# Patient Record
Sex: MALE | Race: WHITE | Employment: FULL TIME | URBAN - METROPOLITAN AREA
[De-identification: names, ages, dates, MRNs, and addresses within clinical notes are randomized per-mention and may not be internally consistent; named-entity substitution may affect disease eponyms.]

---

## 2017-08-23 ENCOUNTER — HOSPITAL ENCOUNTER (EMERGENCY)
Age: 27
Discharge: HOME | End: 2017-08-23
Attending: EMERGENCY MEDICINE
Payer: COMMERCIAL

## 2017-08-23 VITALS
HEART RATE: 90 BPM | RESPIRATION RATE: 18 BRPM | HEIGHT: 67 IN | BODY MASS INDEX: 22.76 KG/M2 | TEMPERATURE: 99 F | OXYGEN SATURATION: 99 % | DIASTOLIC BLOOD PRESSURE: 88 MMHG | SYSTOLIC BLOOD PRESSURE: 158 MMHG | WEIGHT: 145 LBS

## 2017-08-23 DIAGNOSIS — T14.8XXA MUSCLE STRAIN: Primary | ICD-10-CM

## 2017-08-23 DIAGNOSIS — S40.021A CONTUSION OF RIGHT UPPER EXTREMITY, INITIAL ENCOUNTER: ICD-10-CM

## 2017-08-23 DIAGNOSIS — S39.012A BACK STRAIN, INITIAL ENCOUNTER: ICD-10-CM

## 2017-08-23 PROCEDURE — 99283 EMERGENCY DEPT VISIT LOW MDM: CPT
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
## 2019-11-19 ENCOUNTER — OFFICE VISIT (OUTPATIENT)
Dept: FAMILY MEDICINE CLINIC | Facility: CLINIC | Age: 29
End: 2019-11-19
Payer: COMMERCIAL

## 2019-11-19 VITALS
HEART RATE: 86 BPM | RESPIRATION RATE: 16 BRPM | BODY MASS INDEX: 27.25 KG/M2 | OXYGEN SATURATION: 98 % | TEMPERATURE: 99 F | HEIGHT: 67 IN | WEIGHT: 173.63 LBS

## 2019-11-19 DIAGNOSIS — H00.014 HORDEOLUM EXTERNUM OF LEFT UPPER EYELID: ICD-10-CM

## 2019-11-19 DIAGNOSIS — L03.90 CELLULITIS, UNSPECIFIED CELLULITIS SITE: Primary | ICD-10-CM

## 2019-11-19 PROCEDURE — 99202 OFFICE O/P NEW SF 15 MIN: CPT | Performed by: NURSE PRACTITIONER

## 2019-11-19 RX ORDER — CEPHALEXIN 500 MG/1
500 CAPSULE ORAL 2 TIMES DAILY
Qty: 14 CAPSULE | Refills: 0 | Status: SHIPPED | OUTPATIENT
Start: 2019-11-19 | End: 2019-11-26

## 2019-11-19 NOTE — PROGRESS NOTES
CHIEF COMPLAINT:   Patient presents with:  Eye Problem: left eye stye sx x 7 days. HPI:   Karissa Dozier is a 34year old male who presents with chief complaint of bump on left upper eyelid. Symptoms began 7 days ago.  Symptoms have been worsening si EYES: PERRLA, EOMI, normal optic disk, conjunctiva clear, no discharge. left eyelid swollen with soft erythematous tissue swelling and small pimple on lip of upper left eye lid. Mild tenderness with palpation. + lid swelling; + tenderness.   No swelling Cellulitis is an infection of the deep layers of skin. A break in the skin, such as a cut or scratch, can let bacteria under the skin. If the bacteria get to deep layers of the skin, it can be serious.  If not treated, cellulitis can get into the bloodstrea Date Last Reviewed: 9/1/2016  © 8650-1147 [employer]. 1407 [facility] [place], [address] [place]. All rights reserved. This information is not intended as a substitute for professional medical care.  Always follow your healthcare professional' · Inability to open the eyelid due to swelling  · Fever of 100.4°F (38°C) or above, or as directed by your provider  · Vision changes  · Headache or stiff neck  · The sty comes back  Date Last Reviewed: 8/1/2017  © 8500-1362 [employer].  1467 [facility]

## 2019-11-19 NOTE — PATIENT INSTRUCTIONS
Cellulitis  Cellulitis is an infection of the deep layers of skin. A break in the skin, such as a cut or scratch, can let bacteria under the skin. If the bacteria get to deep layers of the skin, it can be serious.  If not treated, cellulitis can get into · Fever higher of 100.4º F (38.0º C) or higher after 2 days on antibiotics  Date Last Reviewed: 9/1/2016  © 5530-7166 [employer]. 1407 [facility], [address] [place] [address]. All rights reserved.  This information is not intended as a substitut · Drainage of blood or thick pus from the sty  · Blister on the eyelid  · Inability to open the eyelid due to swelling  · Fever of 100.4°F (38°C) or above, or as directed by your provider  · Vision changes  · Headache or stiff neck  · The sty comes back  D

## 2019-12-17 ENCOUNTER — OFFICE VISIT (OUTPATIENT)
Dept: FAMILY MEDICINE CLINIC | Facility: CLINIC | Age: 29
End: 2019-12-17
Payer: COMMERCIAL

## 2019-12-17 VITALS
RESPIRATION RATE: 14 BRPM | TEMPERATURE: 99 F | OXYGEN SATURATION: 99 % | DIASTOLIC BLOOD PRESSURE: 78 MMHG | WEIGHT: 170 LBS | BODY MASS INDEX: 26.68 KG/M2 | HEIGHT: 67 IN | SYSTOLIC BLOOD PRESSURE: 118 MMHG | HEART RATE: 76 BPM

## 2019-12-17 DIAGNOSIS — H00.014 HORDEOLUM EXTERNUM OF LEFT UPPER EYELID: Primary | ICD-10-CM

## 2019-12-17 DIAGNOSIS — H69.83 DYSFUNCTION OF BOTH EUSTACHIAN TUBES: ICD-10-CM

## 2019-12-17 PROCEDURE — 99213 OFFICE O/P EST LOW 20 MIN: CPT | Performed by: NURSE PRACTITIONER

## 2019-12-17 RX ORDER — FLUTICASONE PROPIONATE 50 MCG
2 SPRAY, SUSPENSION (ML) NASAL DAILY
Qty: 1 BOTTLE | Refills: 0 | Status: SHIPPED | OUTPATIENT
Start: 2019-12-17 | End: 2019-12-31

## 2019-12-17 RX ORDER — ERYTHROMYCIN 5 MG/G
1 OINTMENT OPHTHALMIC EVERY 6 HOURS
Qty: 3.5 G | Refills: 0 | Status: SHIPPED | OUTPATIENT
Start: 2019-12-17 | End: 2019-12-24

## 2019-12-17 NOTE — PATIENT INSTRUCTIONS
Apply prescription ointment as directed  Apply warm, moist compress for 15 minutes throughout the day  Cleanse eyelids daily with a neutral soap ([employer]'s Baby Shampoo) in a 1:1 solution with clean water  If no improvement seek ophthalmologist    Flonase swelling  · Fever of 100.4°F (38°C) or above, or as directed by your provider  · Vision changes  · Headache or stiff neck  · The sty comes back  Date Last Reviewed: 8/1/2017  © 6088-5216 [employer]. 1407 [facility] [place], [address] [place] [address].

## 2019-12-17 NOTE — PROGRESS NOTES
CHIEF COMPLAINT:   Patient presents with:  Bump/lump On Eyelid: x today. HPI:   Miladis Escobar is a 34year old male who presents with chief complaint of bump on left upper eyelid. Symptoms began today. Symptoms have been consistent since onset. EYES: PERRLA, EOMI, conjunctiva clear, no discharge. Left upper eyelid with erythematous stye with minimal lid swelling; + tenderness. No swelling or redness of periorbital tissue. Vision corrected - right eye : 20/30, left eye 20/30.     HENT: atraumati Apply warm, moist compress for 15 minutes throughout the day  Cleanse eyelids daily with a neutral soap (Son's Baby Shampoo) in a 1:1 solution with clean water  If no improvement seek ophthalmologist    Flonase as prescribed         Lisa (or Stye)  DINA paula · Fever of 100.4°F (38°C) or above, or as directed by your provider  · Vision changes  · Headache or stiff neck  · The sty comes back  Date Last Reviewed: 8/1/2017  © 6358-2507 [employer]. 1407 [facility] [place], [address] [place] [address].  All right

## 2022-11-17 ENCOUNTER — HOSPITAL ENCOUNTER (EMERGENCY)
Age: 32
Discharge: HOME | End: 2022-11-18
Attending: EMERGENCY MEDICINE
Payer: COMMERCIAL

## 2022-11-17 ENCOUNTER — APPOINTMENT (OUTPATIENT)
Dept: GENERAL RADIOLOGY | Age: 32
End: 2022-11-17
Payer: COMMERCIAL

## 2022-11-17 DIAGNOSIS — R04.2 HEMOPTYSIS: ICD-10-CM

## 2022-11-17 DIAGNOSIS — R05.9 COUGH, UNSPECIFIED TYPE: Primary | ICD-10-CM

## 2022-11-17 PROCEDURE — 71045 X-RAY EXAM CHEST 1 VIEW: CPT

## 2022-11-17 PROCEDURE — 99283 EMERGENCY DEPT VISIT LOW MDM: CPT

## 2022-11-18 VITALS
BODY MASS INDEX: 25.11 KG/M2 | RESPIRATION RATE: 16 BRPM | HEART RATE: 79 BPM | HEIGHT: 67 IN | SYSTOLIC BLOOD PRESSURE: 136 MMHG | TEMPERATURE: 99 F | WEIGHT: 160 LBS | DIASTOLIC BLOOD PRESSURE: 78 MMHG | OXYGEN SATURATION: 97 %

## 2022-11-18 RX ORDER — BENZONATATE 100 MG/1
100 CAPSULE ORAL 3 TIMES DAILY PRN
Qty: 30 CAPSULE | Refills: 0 | Status: SHIPPED | OUTPATIENT
Start: 2022-11-18 | End: 2022-12-18

## 2022-11-18 NOTE — DISCHARGE INSTRUCTIONS
Please follow-up with your primary care physician 1-2 days return to the ER if your symptoms worsen progress or if you have any further concerns. Please use a humidifier at night use the benzonatate as prescribed. He can take up to 200 mg at a time. You can also use over-the-counter cough medicine like Robitussin-DM. If you are having difficulty breathing chest pain palpitations dizziness lightheadedness or any further concerning symptoms return to the ER immediately.

## 2022-11-18 NOTE — ED INITIAL ASSESSMENT (HPI)
PT to the ED for evaluation of productive cough with blood streaked sputum. PT reports the cough began 3 weeks ago when he tested positive for COVID. He now tests negative, but the cough has persisted and now his sputum is blood-streaked.

## 2023-03-21 ENCOUNTER — OFFICE VISIT (OUTPATIENT)
Dept: FAMILY MEDICINE CLINIC | Facility: CLINIC | Age: 33
End: 2023-03-21
Payer: COMMERCIAL

## 2023-03-21 VITALS
OXYGEN SATURATION: 97 % | TEMPERATURE: 98 F | SYSTOLIC BLOOD PRESSURE: 128 MMHG | BODY MASS INDEX: 25.9 KG/M2 | HEIGHT: 67 IN | DIASTOLIC BLOOD PRESSURE: 82 MMHG | WEIGHT: 165 LBS | RESPIRATION RATE: 18 BRPM | HEART RATE: 94 BPM

## 2023-03-21 DIAGNOSIS — H69.83 DYSFUNCTION OF BOTH EUSTACHIAN TUBES: ICD-10-CM

## 2023-03-21 DIAGNOSIS — K11.20 SIALADENITIS: ICD-10-CM

## 2023-03-21 DIAGNOSIS — H92.02 LEFT EAR PAIN: Primary | ICD-10-CM

## 2023-03-21 PROCEDURE — 3074F SYST BP LT 130 MM HG: CPT | Performed by: NURSE PRACTITIONER

## 2023-03-21 PROCEDURE — 3008F BODY MASS INDEX DOCD: CPT | Performed by: NURSE PRACTITIONER

## 2023-03-21 PROCEDURE — 3079F DIAST BP 80-89 MM HG: CPT | Performed by: NURSE PRACTITIONER

## 2023-03-21 PROCEDURE — 99202 OFFICE O/P NEW SF 15 MIN: CPT | Performed by: NURSE PRACTITIONER

## 2023-03-21 RX ORDER — FLUTICASONE PROPIONATE 50 MCG
1 SPRAY, SUSPENSION (ML) NASAL 2 TIMES DAILY
Qty: 1 EACH | Refills: 2 | Status: SHIPPED | OUTPATIENT
Start: 2023-03-21 | End: 2023-04-20

## 2023-03-21 RX ORDER — AMOXICILLIN 875 MG/1
875 TABLET, COATED ORAL 2 TIMES DAILY
Qty: 20 TABLET | Refills: 0 | Status: SHIPPED | OUTPATIENT
Start: 2023-03-21 | End: 2023-03-31

## 2023-03-21 NOTE — PATIENT INSTRUCTIONS
Please start Flonase 1 spray each nostril twice daily before brushing teeth. Use for at least one to two weeks. Ear pressure may take up to 3 months to resolve. May stop if improving. Restart if symptoms return. See next page for instructions for treatment of ear pain.  Follow up with PCP if symptoms persist.

## 2023-12-27 ENCOUNTER — APPOINTMENT (OUTPATIENT)
Dept: GENERAL RADIOLOGY | Age: 33
End: 2023-12-27
Payer: COMMERCIAL

## 2023-12-27 ENCOUNTER — APPOINTMENT (OUTPATIENT)
Dept: GENERAL RADIOLOGY | Age: 33
End: 2023-12-27
Attending: PHYSICIAN ASSISTANT
Payer: COMMERCIAL

## 2023-12-27 ENCOUNTER — HOSPITAL ENCOUNTER (EMERGENCY)
Age: 33
Discharge: HOME | End: 2023-12-27
Attending: EMERGENCY MEDICINE
Payer: COMMERCIAL

## 2023-12-27 VITALS
RESPIRATION RATE: 15 BRPM | DIASTOLIC BLOOD PRESSURE: 90 MMHG | SYSTOLIC BLOOD PRESSURE: 141 MMHG | HEART RATE: 85 BPM | OXYGEN SATURATION: 97 % | WEIGHT: 165 LBS | TEMPERATURE: 100 F | HEIGHT: 67 IN | BODY MASS INDEX: 25.9 KG/M2

## 2023-12-27 DIAGNOSIS — S61.022A LACERATION OF LEFT THUMB WITH FOREIGN BODY WITHOUT DAMAGE TO NAIL, INITIAL ENCOUNTER: Primary | ICD-10-CM

## 2023-12-27 DIAGNOSIS — S60.450A FOREIGN BODY OF RIGHT INDEX FINGER: ICD-10-CM

## 2023-12-27 PROCEDURE — 12001 RPR S/N/AX/GEN/TRNK 2.5CM/<: CPT

## 2023-12-27 PROCEDURE — 99284 EMERGENCY DEPT VISIT MOD MDM: CPT

## 2023-12-27 PROCEDURE — 73130 X-RAY EXAM OF HAND: CPT

## 2023-12-27 PROCEDURE — 73140 X-RAY EXAM OF FINGER(S): CPT | Performed by: PHYSICIAN ASSISTANT

## 2023-12-27 RX ORDER — CEPHALEXIN 500 MG/1
500 CAPSULE ORAL 2 TIMES DAILY
Qty: 10 CAPSULE | Refills: 0 | Status: SHIPPED | OUTPATIENT
Start: 2023-12-27 | End: 2024-01-01

## 2023-12-27 NOTE — DISCHARGE INSTRUCTIONS
Prophylactic antibiotics as prescribed, follow-up with Ortho hand    Keep the area clean and dry            Keep the area dry for the next 24 hours. Apply bacitracin antibiotic daily for the first 48 hours after sutures are placed. Keep the area covered with a bandage for the first 48 hours changing daily or if dressing is dirty or saturated. You may let water run over the area 24 hours after sutures are placed. Do not submerge the area in water until sutures are removed. Do not scrub the area. The wound may be left to open air after the first 2 days when you are at home and not in danger of infecting the wound. It is recommended to keep the wound covered at work and while sleeping. Otherwise it may be left to open air for ventilation.

## 2023-12-28 ENCOUNTER — TELEPHONE (OUTPATIENT)
Dept: ORTHOPEDICS CLINIC | Facility: CLINIC | Age: 33
End: 2023-12-28
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
## 2023-12-28 NOTE — TELEPHONE ENCOUNTER
Patient is requesting an appointment for foreign bodies in both hands. Can patient be seen next week?

## 2024-12-26 ENCOUNTER — HOSPITAL ENCOUNTER (OUTPATIENT)
Age: 34
Discharge: HOME | End: 2024-12-26
Payer: COMMERCIAL

## 2024-12-26 ENCOUNTER — APPOINTMENT (OUTPATIENT)
Dept: CT IMAGING | Age: 34
End: 2024-12-26
Attending: NURSE PRACTITIONER
Payer: COMMERCIAL

## 2024-12-26 VITALS
OXYGEN SATURATION: 99 % | WEIGHT: 175 LBS | BODY MASS INDEX: 27.47 KG/M2 | HEIGHT: 67 IN | DIASTOLIC BLOOD PRESSURE: 88 MMHG | HEART RATE: 86 BPM | SYSTOLIC BLOOD PRESSURE: 141 MMHG | RESPIRATION RATE: 18 BRPM | TEMPERATURE: 99 F

## 2024-12-26 DIAGNOSIS — R10.84 ABDOMINAL PAIN, GENERALIZED: Primary | ICD-10-CM

## 2024-12-26 LAB
#MXD IC: 1 X10ˆ3/UL (ref 0.1–1)
BUN BLD-MCNC: 14 MG/DL (ref 7–18)
CHLORIDE BLD-SCNC: 102 MMOL/L (ref 98–112)
CO2 BLD-SCNC: 28 MMOL/L (ref 21–32)
CREAT BLD-MCNC: 1 MG/DL
EGFRCR SERPLBLD CKD-EPI 2021: 103 ML/MIN/1.73M2 (ref 60–?)
GLUCOSE BLD-MCNC: 101 MG/DL (ref 70–99)
HCT VFR BLD AUTO: 45.2 %
HCT VFR BLD CALC: 45 %
HGB BLD-MCNC: 15.6 G/DL
ISTAT IONIZED CALCIUM FOR CHEM 8: 1.16 MMOL/L (ref 1.12–1.32)
LYMPHOCYTES # BLD AUTO: 3 X10ˆ3/UL (ref 1–4)
LYMPHOCYTES NFR BLD AUTO: 35 %
MCH RBC QN AUTO: 28.9 PG (ref 26–34)
MCHC RBC AUTO-ENTMCNC: 34.5 G/DL (ref 31–37)
MCV RBC AUTO: 83.7 FL (ref 80–100)
MIXED CELL %: 12.2 %
NEUTROPHILS # BLD AUTO: 4.6 X10ˆ3/UL (ref 1.5–7.7)
NEUTROPHILS NFR BLD AUTO: 52.8 %
PLATELET # BLD AUTO: 242 X10ˆ3/UL (ref 150–450)
POTASSIUM BLD-SCNC: 4 MMOL/L (ref 3.6–5.1)
RBC # BLD AUTO: 5.4 X10ˆ6/UL
SODIUM BLD-SCNC: 141 MMOL/L (ref 136–145)
WBC # BLD AUTO: 8.6 X10ˆ3/UL (ref 4–11)

## 2024-12-26 PROCEDURE — 80047 BASIC METABLC PNL IONIZED CA: CPT | Performed by: NURSE PRACTITIONER

## 2024-12-26 PROCEDURE — 99204 OFFICE O/P NEW MOD 45 MIN: CPT | Performed by: NURSE PRACTITIONER

## 2024-12-26 PROCEDURE — 85025 COMPLETE CBC W/AUTO DIFF WBC: CPT | Performed by: NURSE PRACTITIONER

## 2024-12-26 PROCEDURE — 74177 CT ABD & PELVIS W/CONTRAST: CPT | Performed by: NURSE PRACTITIONER

## 2024-12-26 RX ORDER — IOHEXOL 350 MG/ML
100 INJECTION, SOLUTION INTRAVENOUS
Status: COMPLETED | OUTPATIENT
Start: 2024-12-26 | End: 2024-12-26

## 2024-12-26 NOTE — ED PROVIDER NOTES
Patient Seen in: [facility]    History     Chief Complaint   Patient presents with    Abdominal Pain     Stated Complaint: worm in stool; abdominal pain    HPI    Patient complains of bilateral  abdominal pain that began yesterday.  Pain described as cramping and at times sharp .  Pain rated as 4/10. Pt had a bowel movement this am and saw a white thing In his still that he is afraid is a worm.   Patient denies any recent travel.  He has not had sushi.  Or any other foods that could potentially cause worms.        History reviewed. No pertinent past medical history.    History reviewed. No pertinent surgical history.         Family History   Problem Relation Age of Onset    Hypertension Maternal Grandfather     Asthma Maternal Grandfather        Social History     Socioeconomic History    Marital status:    Tobacco Use    Smoking status: Never     Passive exposure: Never    Smokeless tobacco: Never   Vaping Use    Vaping status: Never Used   Substance and Sexual Activity    Alcohol use: Not Currently     Comment: Rare    Drug use: Never       Review of Systems    Positive for stated complaint: worm in stool; abdominal pain  Other systems are as noted in HPI.  Constitutional and vital signs reviewed.      All other systems reviewed and negative except as noted above.    PSFH elements reviewed from today and agreed except as otherwise stated in HPI.    Physical Exam     ED Triage Vitals [12/26/24 1546]   /88   Pulse 86   Resp 18   Temp 98.6 °F (37 °C)   Temp src Oral   SpO2 99 %   O2 Device None (Room air)       Current:/88   Pulse 86   Temp 98.6 °F (37 °C) (Oral)   Resp 18   Ht 170.2 cm (5' 7\")   Wt 79.4 kg   SpO2 99%   BMI 27.41 kg/m²   Pulse ox 99% on room air        Physical Exam  General Appearance: alert, no distress  Eyes: pupils equal and round no pallor or injection  ENT, Mouth: mucous membranes moist  Respiratory: there are no retractions, lungs are clear to  auscultation  Cardiovascular: regular rate and rhythm    Gastrointestinal: soft, non tender, no mass, normal bowel sounds, no swelling, no ecchymosis, no pain at mcburney's point, negative thomas sign.  Neurological: II-XII grossly intact  no focal deficits  Skin: warm and dry, no rashes.  Musculoskeletal: neck is supple non tender        Extremities are symmetrical, full range of motion  Psychiatric: patient is pleasant, there is no agitation      ED Course   Labs Reviewed - No data to display  I have personally  reviewed available prior medical records for any recent pertinent discharge summaries/testing. Patient/family updated on results and plan, a verbalized understanding and agreement with the plan.  I explained to the patient that emergent conditions may arise and to go to the ER for new, worsening or any persistent conditions. I've explained the importance of taking all medicatons as prescribed, follow up, and return precuations,  All questions answered.    Please note that this report has been produced using speech recognition software and may contain errors related to that system including, but not limited to, errors in grammar, punctuation, and spelling, as well as words and phrases that possibly may have been recognized inappropriately.  If there are any questions or concerns, contact the dictating provider for clarification.  MDM     Sources of the medical history included the patient     Differential diagnosis before testing included gas, constipation, intestinal worms, a medical condition that poses a threat to life    Labs are within normal limits.  The patient is hyperglycemic at 122.  CT scan shows no acute findings.  Outpatient stool was ordered.  Patient was discharged home in stable condition to follow-up with his primary care physician and to return to the emergency department with any worsening symptoms or concern    I have discussed the patient's results of testing, differential diagnosis and  treatment plan, they expressed clear understanding of these instructions and agreed to the plan provided    The 21st Century Cures Act makes medical notes like these available to patients in the interest of transparency. Please be advised this is a medical document. Medical documents are intended to carry relevant information, facts as evident, and the clinical opinion of the practitioner. The medical note is intended as peer to peer communication and may appear blunt or direct. It is written in medical language and may contain abbreviations or verbiage that are unfamiliar.      Disposition and Plan     Clinical Impression:  No diagnosis found.    Disposition:  There is no disposition on file for this visit.    Follow-up:  No follow-up provider specified.    Medications Prescribed:  There are no discharge medications for this patient.

## 2024-12-26 NOTE — ED INITIAL ASSESSMENT (HPI)
Patient states he began having abd pain yesterday. Thinks he noted a single white worm in his stool this morning. Denies N/V/D. Denies recent travel.

## 2024-12-27 ENCOUNTER — LAB ENCOUNTER (OUTPATIENT)
Dept: LAB | Age: 34
End: 2024-12-27
Attending: NURSE PRACTITIONER
Payer: COMMERCIAL

## 2024-12-27 DIAGNOSIS — R10.84 ABDOMINAL PAIN, GENERALIZED: ICD-10-CM

## 2024-12-27 LAB
CRYPTOSP AG STL QL IA: NEGATIVE
G LAMBLIA AG STL QL IA: NEGATIVE

## 2024-12-27 PROCEDURE — 87272 CRYPTOSPORIDIUM AG IF: CPT

## 2024-12-27 PROCEDURE — 87329 GIARDIA AG IA: CPT

## (undated) NOTE — ED AVS SNAPSHOT
Mr. Vivian Ortez   MRN: [id number]    Department:  [facility] Emergency Department in [place]   Date of Visit:  8/23/2017           Disclosure     Insurance plans vary and the physician(s) referred by the ER may not be covered by your plan.  Please If you have been prescribed any medication(s), please fill your prescription right away and begin taking the medication(s) as directed    If the emergency physician has read X-rays, these will be re-interpreted by a radiologist.  If there is a significant